# Patient Record
Sex: MALE | Employment: FULL TIME | URBAN - METROPOLITAN AREA
[De-identification: names, ages, dates, MRNs, and addresses within clinical notes are randomized per-mention and may not be internally consistent; named-entity substitution may affect disease eponyms.]

---

## 2017-01-17 ENCOUNTER — OFFICE VISIT (OUTPATIENT)
Dept: PSYCHIATRY | Facility: CLINIC | Age: 27
End: 2017-01-17
Payer: COMMERCIAL

## 2017-01-17 VITALS
WEIGHT: 225 LBS | HEIGHT: 74 IN | HEART RATE: 69 BPM | BODY MASS INDEX: 28.88 KG/M2 | DIASTOLIC BLOOD PRESSURE: 57 MMHG | SYSTOLIC BLOOD PRESSURE: 123 MMHG

## 2017-01-17 DIAGNOSIS — F41.1 GENERALIZED ANXIETY DISORDER: ICD-10-CM

## 2017-01-17 DIAGNOSIS — F43.21 GRIEF: ICD-10-CM

## 2017-01-17 DIAGNOSIS — G47.00 INSOMNIA, UNSPECIFIED TYPE: ICD-10-CM

## 2017-01-17 DIAGNOSIS — F41.0 PANIC DISORDER WITHOUT AGORAPHOBIA: ICD-10-CM

## 2017-01-17 DIAGNOSIS — F34.1 DYSTHYMIC DISORDER: ICD-10-CM

## 2017-01-17 PROCEDURE — 90833 PSYTX W PT W E/M 30 MIN: CPT | Mod: S$GLB,,, | Performed by: NURSE PRACTITIONER

## 2017-01-17 PROCEDURE — 99999 PR PBB SHADOW E&M-EST. PATIENT-LVL III: CPT | Mod: PBBFAC,,, | Performed by: NURSE PRACTITIONER

## 2017-01-17 PROCEDURE — 1159F MED LIST DOCD IN RCRD: CPT | Mod: S$GLB,,, | Performed by: NURSE PRACTITIONER

## 2017-01-17 PROCEDURE — 99214 OFFICE O/P EST MOD 30 MIN: CPT | Mod: S$GLB,,, | Performed by: NURSE PRACTITIONER

## 2017-01-17 RX ORDER — SERTRALINE HYDROCHLORIDE 25 MG/1
TABLET, FILM COATED ORAL
Qty: 45 TABLET | Refills: 0 | Status: SHIPPED | OUTPATIENT
Start: 2017-01-17

## 2017-01-17 RX ORDER — SERTRALINE HYDROCHLORIDE 100 MG/1
100 TABLET, FILM COATED ORAL DAILY
Qty: 30 TABLET | Refills: 5 | Status: SHIPPED | OUTPATIENT
Start: 2017-01-17

## 2017-01-17 NOTE — MR AVS SNAPSHOT
[facility] - Psychiatry  [address]  [facility] [address]  Phone: [phone]  Fax: [phone]                  Gavin Levy   2017 8:00 AM   Office Visit    Description:  Male : 1990   Provider:  Gordon Shore NP   Department:  [facility] - Psychiatry           Reason for Visit     Inattention           Diagnoses this Visit        Comments    Generalized anxiety disorder         Panic disorder without agoraphobia         Dysthymic disorder         Insomnia, unspecified type         Grief                To Do List           Goals (5 Years of Data)     None      Follow-Up and Disposition     Return in about 8 weeks (around 3/14/2017).       These Medications        Disp Refills Start End    sertraline (ZOLOFT) 25 MG tablet 45 tablet 0 2017     Take 1 tab by mouth each morning x 14 days, then 2 tabs by mouth each morning    Pharmacy: [facility] [id number] [place] [address] - [address] AT [facility] Ph #: [phone]       sertraline (ZOLOFT) 100 MG tablet 30 tablet 5 2017     Take 1 tablet (100 mg total) by mouth once daily. - Oral    Pharmacy: [id number] [place] - [address] AT [facility] Ph #: [phone]         [facility] On [facility]     [facility] -  Assistance  Registered nurses in the [facility] provide clinical advisement, health education, appointment booking, and other advisory services.  Call for this free service at [phone].             Medications           Message regarding Medications     Verify the changes and/or additions to your medication regime listed below are the same as discussed with your clinician today.  If any of these changes or additions are incorrect, please notify your healthcare provider.        START taking these NEW medications        Refills    sertraline (ZOLOFT) 25 MG tablet 0    Sig: Take 1 tab by mouth each morning x 14 days,  "then 2 tabs by mouth each morning    Class: Normal    sertraline (ZOLOFT) 100 MG tablet 5    Sig: Take 1 tablet (100 mg total) by mouth once daily.    Class: Normal    Route: Oral           Verify that the below list of medications is an accurate representation of the medications you are currently taking.  If none reported, the list may be blank. If incorrect, please contact your healthcare provider. Carry this list with you in case of emergency.           Current Medications     dextroamphetamine-amphetamine (ADDERALL XR) 20 MG 24 hr capsule Take 1 capsule (20 mg total) by mouth every morning.    sertraline (ZOLOFT) 100 MG tablet Take 1 tablet (100 mg total) by mouth once daily.    sertraline (ZOLOFT) 25 MG tablet Take 1 tab by mouth each morning x 14 days, then 2 tabs by mouth each morning           Clinical Reference Information           Vital Signs - Last Recorded  Most recent update: 1/17/2017  8:10 AM by Bernard Gage    BP Pulse Ht Wt BMI    (!) 123/57 69 6' 2" (1.88 m) 102.1 kg (225 lb) 28.89 kg/m2      Blood Pressure          Most Recent Value    BP  (!)  123/57      Allergies as of 1/17/2017     No Known Allergies      Immunizations Administered on Date of Encounter - 1/17/2017     None      Instructions    [facility] - DEPARTMENT OF PSYCHIATRY   PATIENT INFORMATION    We appreciate the opportunity to participate in your medical care and hope the following protocols will make it easier for you to receive quality treatment in our department.    1. PUNCTUALITY: Your appointment is scheduled for a fixed amount of time.  To get the benefit of your appointment, please arrive early enough to allow time for traffic, parking and registration.  If you are late for your appointment, you may have to reschedule.  Please make every effort to be on time.      2. PAYMENT FOR SERVICES:   Payments are expected at the time of service.  Please contact [phone] if you need to resolve issues involving your " account at [facility] or to set up a payment plan.    3. CANCELLATION / MISSED APPOINTMENTS:   In order to receive quality care, all appointments must be kept.  Appointment may be cancelled at least 24 hours before your appointment time. If you do not give at least 24-hour notice of cancellation a fee may be billed directly to the patient.  Please note that insurance does not cover no-show charges, so you will be billed directly.  If you are consistently late, cancel, or do not show for your appointments, our department reserves the right to terminate treatment     MESSAGES- In general you can reach the department by calling [phone], between 8:00 a.m. and 5:00 p.m., Monday through Friday.  You can also use the [facility] Patient Portal.     AFTER HOURS, WEEKEND OR HOLIDAYS- For urgent questions after hours, weekends and holidays, calling the department number [phone] will connect you with a representative.  EMERGENCY-  In case of a crisis when there is a concern of harm to self or others, call 911 or the office [phone] between 8:00 a.m. and 5:00 p.m., Monday through Friday or go to the [facility] Emergency Room.    4. PRESCRIPTION REFILLS:  Prescription refills must be done at your physician office visit, You will be given a sufficient number of refills to last until your next appointment, you must come to appointments for prescriptions.   No additional refills will be approved beyond the original treatment plan. Again, please note that no additional prescriptions will be approved per patient request.     5. FOLLOW UP APPOINTMENTS:  Follow-up appointments can be made in person at the Psychiatry Appointment Desk, online through the [facility] Portal, or by calling [phone], from 8am to 5pm, between Monday and Friday.  Patients are responsible for scheduling their own follow-up appointment,  It  Is recommended you schedule your appointment before leaving the clinic.    6. Providers are NOT ABLE  to schedule appointments; all appointments must be made through the appointment department or through [facility].           PATIENTS MAY EXPERIENCE SYMPTOMS OF WITHDRAWAL IF THEY RUN OUT OF MEDICATIONS.  THE PATIENT WILL ASSUME ALL RESPONSIBILITIES OF ANY OUTCOMES WHEN THERE IS AN ISSUE WITH NONCOMPLIANCE WITH FOLLOW-UP APPOINTMENTS AND MEDICATIONS.        THERE IS TO BE NO USE OF ALCOHOL AND/OR ILLEGAL SUBSTANCES    PATIENT ARE TO GO TO THE CLOSEST EMERGENCY ROOM IF FEELING A THREAT TO THEMSELVES OR OTHER OR IF GRAVELY DISABLED    TELL US HOW WE ARE DOING.  PLEASE COMPLETE THE PATIENT SATISFACTION SURVERY  Revised December 2016         Smoking Cessation     If you would like to quit smoking:   You may be eligible for free services if you are a [place] resident and started smoking cigarettes before September 1, 1988.  Call the [employer]oking [employer]essation [employer] ([employer]) toll free at [phone] or [phone].   Call 2-707-QUIT-NOW if you do not meet the above criteria.

## 2017-01-17 NOTE — PROGRESS NOTES
"Outpatient Psychiatry Follow-Up Visit (MD/NP)    2017    Clinical Status of Patient:  Outpatient (Ambulatory)    Chief Complaint:  Gavin Levy is a 27 y.o. male who presents today for follow-up of attention problems.  Met with patient.      Interval History and Content of Current Session:  Interim Events/Subjective Report/Content of Current Session:     Patient was under the care of a resident, presents to establish care, chart reviewed.  Last seen 10/2015     reviewed, patient found but no RX found    Adderall XR 20mg po q day    Patient reports no meds for almost a year, presents is issues with anxiety and depression.  Easily overwhelmed, worries, compares himself to his friends.  Last year his father .  Over analyzes his life, where he is at this point, has friends who are "doing real well".  Fleeting anxiety and panic, work stressful, patient is a  for [employer] office, taking classes at night. Living at home, low esteem.  Poor coping mechanism, becomes very overwhelmed if he gets an email from a professor.  Difficulty falling asleep but able to maintain sleep. Dysthymic mood, more bad days then good, functions overall but no satisfaction.  Fleeting panic attacks 3 or more panic attacks week, SOB, rapid HR, muscle tension, tremors, willl walk around the block to relieve anxiety.  Grades good, A/Bs.     No meds in the past for mood and/or anxiety. No inpatient psych admits, denies H/SI.    Psychotherapy:  · Target symptoms: depression, anxiety   · Why chosen therapy is appropriate versus another modality: relevant to diagnosis  · Outcome monitoring methods: self-report  · Therapeutic intervention type: insight oriented psychotherapy  · Topics discussed/themes: symptom recognition  · The patient's response to the intervention is accepting. The patient's progress toward treatment goals is fair.   · Duration of intervention: 35 minutes.    Review of Systems   GENERAL: No weight gain or loss   SKIN: No " "rashes or lacerations   HEAD: No headaches   EYES: No exophthalmos, jaundice or blindness   EARS: No dizziness, tinnitus or hearing loss   NOSE: No changes in smell   MOUTH & THROAT: No dyskinetic movements or obvious goiter   CHEST: No shortness of breath, hyperventilation or cough   CARDIOVASCULAR: No tachycardia or chest pain   ABDOMEN: No nausea, vomiting, pain, constipation or diarrhea   URINARY: No frequency, dysuria or sexual dysfunction   ENDOCRINE: No polydipsia, polyuria   MUSCULOSKELETAL: No pain or stiffness of the joints   NEUROLOGIC: No weakness, sensory changes, seizures, confusion, memory loss, tremor or other abnormal movements      Psychiatric Review Of Systems:  sleep: yes  appetite changes: no  weight changes: no  energy/anergy: yes  interest/pleasure/anhedonia: yes  somatic symptoms: no  libido: yes  anxiety/panic: yes      Past Medical, Family and Social History: The patient's past medical, family and social history have been reviewed and updated as appropriate within the electronic medical record - see encounter notes.    Compliance: no    Side effects: None    Risk Parameters:  Patient reports no suicidal ideation  Patient reports no homicidal ideation  Patient reports no self-injurious behavior  Patient reports no violent behavior    Exam (detailed: at least 9 elements; comprehensive: all 15 elements)   Constitutional  Vitals:  Most recent vital signs, dated less than 90 days prior to this appointment, were reviewed.   Vitals:    01/17/17 0808   BP: (!) 123/57   Pulse: 69   Weight: 102.1 kg (225 lb)   Height: 6' 2" (1.88 m)        General:  unremarkable, age appropriate, casually dressed, neatly groomed     Musculoskeletal  Muscle Strength/Tone:  no dyskinesia, no dystonia, no tremor, no tic   Gait & Station:  non-ataxic     Psychiatric  Speech:  no latency; no press   Mood & Affect:  anxious, dysthymic, sad  blunted   Thought Process:  normal and logical   Associations:  intact   Thought " Content:  normal, no suicidality, no homicidality, delusions, or paranoia   Insight:  has awareness of illness   Judgement: behavior is adequate to circumstances   Orientation:  grossly intact   Memory: intact for content of interview   Language: grossly intact   Attention Span & Concentration:  able to focus   Fund of Knowledge:  intact and appropriate to age and level of education     Assessment and Diagnosis   Status/Progress: Based on the examination today, the patient's problem(s) is/are worsening.  New problems have been presented today.   Lack of compliance are complicating management of the primary condition.  There are no active rule-out diagnoses for this patient at this time.     General Impression:       ICD-10-CM ICD-9-CM   1. Generalized anxiety disorder F41.1 300.02   2. Panic disorder without agoraphobia F41.0 300.01   3. Dysthymic disorder F34.1 300.4   4. Insomnia, unspecified type G47.00 780.52   5. Grief F43.20 309.0       Intervention/Counseling/Treatment Plan   · Medication Management: The risks and benefits of medication were discussed with the patient.   · Trial Zoloft 25mg po q day x 14 days, then 2 tabs by mouth for 14 days, then Zoloft 100mg po q day  · Encouraged individual therapy      Return to Clinic: 2 months

## 2017-01-17 NOTE — PATIENT INSTRUCTIONS
[facility] - DEPARTMENT OF PSYCHIATRY   PATIENT INFORMATION    We appreciate the opportunity to participate in your medical care and hope the following protocols will make it easier for you to receive quality treatment in our department.    1. PUNCTUALITY: Your appointment is scheduled for a fixed amount of time.  To get the benefit of your appointment, please arrive early enough to allow time for traffic, parking and registration.  If you are late for your appointment, you may have to reschedule.  Please make every effort to be on time.      2. PAYMENT FOR SERVICES:   Payments are expected at the time of service.  Please contact [phone] if you need to resolve issues involving your account at [facility] or to set up a payment plan.    3. CANCELLATION / MISSED APPOINTMENTS:   In order to receive quality care, all appointments must be kept.  Appointment may be cancelled at least 24 hours before your appointment time. If you do not give at least 24-hour notice of cancellation a fee may be billed directly to the patient.  Please note that insurance does not cover no-show charges, so you will be billed directly.  If you are consistently late, cancel, or do not show for your appointments, our department reserves the right to terminate treatment     MESSAGES- In general you can reach the department by calling [phone], between 8:00 a.m. and 5:00 p.m., Monday through Friday.  You can also use the [facility] Patient Portal.     AFTER HOURS, WEEKEND OR HOLIDAYS- For urgent questions after hours, weekends and holidays, calling the department number [phone] will connect you with a representative.  EMERGENCY-  In case of a crisis when there is a concern of harm to self or others, call 911 or the office [phone] between 8:00 a.m. and 5:00 p.m., Monday through Friday or go to the [facility] Emergency Room.    4. PRESCRIPTION REFILLS:  Prescription refills must be done at your physician office visit, You  will be given a sufficient number of refills to last until your next appointment, you must come to appointments for prescriptions.   No additional refills will be approved beyond the original treatment plan. Again, please note that no additional prescriptions will be approved per patient request.     5. FOLLOW UP APPOINTMENTS:  Follow-up appointments can be made in person at the Psychiatry Appointment Desk, online through the [facility] Patient Portal, or by calling [phone], from 8am to 5pm, between Monday and Friday.  Patients are responsible for scheduling their own follow-up appointment,  It  Is recommended you schedule your appointment before leaving the clinic.    6. Providers are NOT ABLE to schedule appointments; all appointments must be made through the appointment department or through [facility].           PATIENTS MAY EXPERIENCE SYMPTOMS OF WITHDRAWAL IF THEY RUN OUT OF MEDICATIONS.  THE PATIENT WILL ASSUME ALL RESPONSIBILITIES OF ANY OUTCOMES WHEN THERE IS AN ISSUE WITH NONCOMPLIANCE WITH FOLLOW-UP APPOINTMENTS AND MEDICATIONS.        THERE IS TO BE NO USE OF ALCOHOL AND/OR ILLEGAL SUBSTANCES    PATIENT ARE TO GO TO THE CLOSEST EMERGENCY ROOM IF FEELING A THREAT TO THEMSELVES OR OTHER OR IF GRAVELY DISABLED    TELL US HOW WE ARE DOING.  PLEASE COMPLETE THE PATIENT SATISFACTION SURVERY  Revised December 2016

## 2020-12-28 ENCOUNTER — CLINICAL SUPPORT (OUTPATIENT)
Dept: URGENT CARE | Facility: CLINIC | Age: 30
End: 2020-12-28
Payer: COMMERCIAL

## 2020-12-28 DIAGNOSIS — Z11.59 SCREENING FOR VIRAL DISEASE: Primary | ICD-10-CM

## 2020-12-28 LAB
CTP QC/QA: YES
SARS-COV-2 RDRP RESP QL NAA+PROBE: NEGATIVE

## 2020-12-28 PROCEDURE — U0002: ICD-10-PCS | Mod: QW,S$GLB,, | Performed by: NURSE PRACTITIONER

## 2020-12-28 PROCEDURE — U0002 COVID-19 LAB TEST NON-CDC: HCPCS | Mod: QW,S$GLB,, | Performed by: NURSE PRACTITIONER

## 2021-01-12 ENCOUNTER — CLINICAL SUPPORT (OUTPATIENT)
Dept: URGENT CARE | Facility: CLINIC | Age: 31
End: 2021-01-12
Payer: COMMERCIAL

## 2021-01-12 ENCOUNTER — TELEPHONE (OUTPATIENT)
Dept: URGENT CARE | Facility: CLINIC | Age: 31
End: 2021-01-12

## 2021-01-12 DIAGNOSIS — U07.1 COVID-19 VIRUS DETECTED: ICD-10-CM

## 2021-01-12 DIAGNOSIS — Z11.59 ENCOUNTER FOR SCREENING FOR OTHER VIRAL DISEASES: Primary | ICD-10-CM

## 2021-01-12 LAB
CTP QC/QA: YES
SARS-COV-2 RDRP RESP QL NAA+PROBE: POSITIVE

## 2021-01-12 PROCEDURE — U0002: ICD-10-PCS | Mod: QW,S$GLB,, | Performed by: NURSE PRACTITIONER

## 2021-01-12 PROCEDURE — U0002 COVID-19 LAB TEST NON-CDC: HCPCS | Mod: QW,S$GLB,, | Performed by: NURSE PRACTITIONER

## 2021-04-16 ENCOUNTER — PATIENT MESSAGE (OUTPATIENT)
Dept: RESEARCH | Facility: HOSPITAL | Age: 31
End: 2021-04-16

## 2023-06-30 ENCOUNTER — TELEPHONE (OUTPATIENT)
Dept: DERMATOLOGY | Facility: CLINIC | Age: 33
End: 2023-06-30
Payer: COMMERCIAL

## 2023-06-30 NOTE — TELEPHONE ENCOUNTER
----- Message from Lazara Coker sent at 6/30/2023 10:38 AM CDT -----  Contact: [phone]  REYMUNDO LINARES calling regarding Appointment Access  (message)  Pt trying to schedule appt with Derm for moles asking call back schedule